# Patient Record
Sex: FEMALE | Race: WHITE | NOT HISPANIC OR LATINO | Employment: OTHER | URBAN - METROPOLITAN AREA
[De-identification: names, ages, dates, MRNs, and addresses within clinical notes are randomized per-mention and may not be internally consistent; named-entity substitution may affect disease eponyms.]

---

## 2023-11-29 ASSESSMENT — ENCOUNTER SYMPTOMS
HEADACHES: 1
BLURRED VISION: 1
HYPERTENSION: 1
SHORTNESS OF BREATH: 1
NECK PAIN: 1

## 2023-12-01 ENCOUNTER — TELEMEDICINE (OUTPATIENT)
Dept: PRIMARY CARE | Facility: CLINIC | Age: 68
End: 2023-12-01
Payer: MEDICARE

## 2023-12-01 DIAGNOSIS — E66.01 OBESITY, MORBID (MULTI): ICD-10-CM

## 2023-12-01 DIAGNOSIS — M16.12 OSTEOARTHRITIS OF LEFT HIP, UNSPECIFIED OSTEOARTHRITIS TYPE: ICD-10-CM

## 2023-12-01 DIAGNOSIS — I10 HYPERTENSION, UNSPECIFIED TYPE: ICD-10-CM

## 2023-12-01 DIAGNOSIS — I10 BENIGN ESSENTIAL HYPERTENSION: Primary | ICD-10-CM

## 2023-12-01 DIAGNOSIS — J32.1 FRONTAL SINUSITIS, UNSPECIFIED CHRONICITY: ICD-10-CM

## 2023-12-01 DIAGNOSIS — K21.9 GASTROESOPHAGEAL REFLUX DISEASE WITHOUT ESOPHAGITIS: ICD-10-CM

## 2023-12-01 PROCEDURE — 99442 PR PHYS/QHP TELEPHONE EVALUATION 11-20 MIN: CPT | Performed by: NURSE PRACTITIONER

## 2023-12-01 RX ORDER — LOSARTAN POTASSIUM AND HYDROCHLOROTHIAZIDE 12.5; 5 MG/1; MG/1
1 TABLET ORAL DAILY
COMMUNITY

## 2023-12-01 RX ORDER — AZITHROMYCIN 250 MG/1
TABLET, FILM COATED ORAL
Qty: 6 TABLET | Refills: 0 | Status: SHIPPED | OUTPATIENT
Start: 2023-12-01 | End: 2023-12-05

## 2023-12-01 RX ORDER — LOSARTAN POTASSIUM AND HYDROCHLOROTHIAZIDE 12.5; 5 MG/1; MG/1
1 TABLET ORAL DAILY
Qty: 90 TABLET | Refills: 1 | Status: SHIPPED | OUTPATIENT
Start: 2023-12-01

## 2023-12-01 ASSESSMENT — ENCOUNTER SYMPTOMS
HYPERTENSION: 1
BLURRED VISION: 1

## 2023-12-01 NOTE — PROGRESS NOTES
Subjective   Patient ID: Jennifer Jones is a 68 y.o. female who presents on telephone visit for follow up.    HPI:  Had COVID about 2 months ago, has not been able to get rid of sinus pressure and drainage.  Having back pain from arthritis.  Takes an aleve in the morning and tylenol at night before bed.  BP readings at home 140's/80's.    Under a lot of stress lately, found her 22 year old grandson and her sister both  in the past few weeks.  Denies chest pain, SOB, palpitations, dizziness,  or GI issues.  Taking medications as prescribed.           Hypertension  This is a chronic problem. The current episode started more than 1 year ago. The problem has been waxing and waning since onset. The problem is controlled. Associated symptoms include anxiety, blurred vision and malaise/fatigue. Agents associated with hypertension include decongestants and NSAIDs. Risk factors for coronary artery disease include obesity, post-menopausal state, sedentary lifestyle and stress. There are no compliance problems.         Review of Systems   Constitutional:  Positive for malaise/fatigue.   Eyes:  Positive for blurred vision.   All other systems reviewed and are negative.      Objective   There were no vitals taken for this visit.        Assessment/Plan   Problem List Items Addressed This Visit             ICD-10-CM    Benign essential hypertension - Primary I10     Continue losartan-hydrochlorothiazide as prescribed  Continue to monitor and record BP at home.         Esophageal reflux K21.9    Osteoarthritis of left hip M16.12    Obesity, morbid (CMS/[facility]) E66.01     Aware of need for lifestyle changes         Frontal sinusitis J32.1     Start azithromycin as directed for sinusitis.         Relevant Medications    azithromycin (Zithromax) 250 mg tablet     Other Visit Diagnoses         Codes    Hypertension, unspecified type     I10    Relevant Medications    losartan-hydrochlorothiazide (Hyzaar) 50-12.5 mg tablet         Would like to defer blood work until follow up visit due to all she has going on with family currently.  Follow up in 6 months or sooner if needed

## 2024-09-01 DIAGNOSIS — I10 HYPERTENSION, UNSPECIFIED TYPE: ICD-10-CM

## 2024-09-04 RX ORDER — LOSARTAN POTASSIUM AND HYDROCHLOROTHIAZIDE 12.5; 5 MG/1; MG/1
1 TABLET ORAL DAILY
Qty: 30 TABLET | Refills: 0 | Status: SHIPPED | OUTPATIENT
Start: 2024-09-04

## 2024-09-30 DIAGNOSIS — I10 HYPERTENSION, UNSPECIFIED TYPE: ICD-10-CM

## 2024-10-02 RX ORDER — LOSARTAN POTASSIUM AND HYDROCHLOROTHIAZIDE 12.5; 5 MG/1; MG/1
1 TABLET ORAL DAILY
Qty: 90 TABLET | Refills: 0 | Status: SHIPPED | OUTPATIENT
Start: 2024-10-02

## 2024-10-09 ENCOUNTER — LAB (OUTPATIENT)
Dept: LAB | Facility: LAB | Age: 69
End: 2024-10-09
Payer: MEDICARE

## 2024-10-09 ENCOUNTER — APPOINTMENT (OUTPATIENT)
Dept: PRIMARY CARE | Facility: CLINIC | Age: 69
End: 2024-10-09
Payer: MEDICARE

## 2024-10-09 VITALS
SYSTOLIC BLOOD PRESSURE: 136 MMHG | HEIGHT: 66 IN | DIASTOLIC BLOOD PRESSURE: 69 MMHG | HEART RATE: 107 BPM | WEIGHT: 243 LBS | BODY MASS INDEX: 39.05 KG/M2 | OXYGEN SATURATION: 96 %

## 2024-10-09 DIAGNOSIS — I10 BENIGN ESSENTIAL HYPERTENSION: ICD-10-CM

## 2024-10-09 DIAGNOSIS — Z12.11 ENCOUNTER FOR COLORECTAL CANCER SCREENING: ICD-10-CM

## 2024-10-09 DIAGNOSIS — H69.93 DYSFUNCTION OF BOTH EUSTACHIAN TUBES: ICD-10-CM

## 2024-10-09 DIAGNOSIS — Z00.00 ROUTINE GENERAL MEDICAL EXAMINATION AT HEALTH CARE FACILITY: Primary | ICD-10-CM

## 2024-10-09 DIAGNOSIS — R73.01 IFG (IMPAIRED FASTING GLUCOSE): ICD-10-CM

## 2024-10-09 DIAGNOSIS — I10 HYPERTENSION, UNSPECIFIED TYPE: ICD-10-CM

## 2024-10-09 DIAGNOSIS — Z13.820 ENCOUNTER FOR SCREENING FOR OSTEOPOROSIS: ICD-10-CM

## 2024-10-09 DIAGNOSIS — Z12.12 ENCOUNTER FOR COLORECTAL CANCER SCREENING: ICD-10-CM

## 2024-10-09 DIAGNOSIS — Z12.31 SCREENING MAMMOGRAM FOR BREAST CANCER: ICD-10-CM

## 2024-10-09 DIAGNOSIS — E66.01 OBESITY, MORBID (MULTI): ICD-10-CM

## 2024-10-09 LAB
ALBUMIN SERPL BCP-MCNC: 4.5 G/DL (ref 3.4–5)
ALP SERPL-CCNC: 81 U/L (ref 33–136)
ALT SERPL W P-5'-P-CCNC: 48 U/L (ref 7–45)
ANION GAP SERPL CALC-SCNC: 15 MMOL/L (ref 10–20)
AST SERPL W P-5'-P-CCNC: 42 U/L (ref 9–39)
BILIRUB SERPL-MCNC: 0.6 MG/DL (ref 0–1.2)
BUN SERPL-MCNC: 15 MG/DL (ref 6–23)
CALCIUM SERPL-MCNC: 9.6 MG/DL (ref 8.6–10.3)
CHLORIDE SERPL-SCNC: 101 MMOL/L (ref 98–107)
CHOLEST SERPL-MCNC: 183 MG/DL (ref 0–199)
CHOLESTEROL/HDL RATIO: 3.3
CO2 SERPL-SCNC: 28 MMOL/L (ref 21–32)
CREAT SERPL-MCNC: 0.85 MG/DL (ref 0.5–1.05)
EGFRCR SERPLBLD CKD-EPI 2021: 73 ML/MIN/1.73M*2
ERYTHROCYTE [DISTWIDTH] IN BLOOD BY AUTOMATED COUNT: 13.3 % (ref 11.5–14.5)
GLUCOSE SERPL-MCNC: 112 MG/DL (ref 74–99)
HCT VFR BLD AUTO: 45.6 % (ref 36–46)
HDLC SERPL-MCNC: 55.4 MG/DL
HGB BLD-MCNC: 14.8 G/DL (ref 12–16)
LDLC SERPL CALC-MCNC: 104 MG/DL
MCH RBC QN AUTO: 28.3 PG (ref 26–34)
MCHC RBC AUTO-ENTMCNC: 32.5 G/DL (ref 32–36)
MCV RBC AUTO: 87 FL (ref 80–100)
NON HDL CHOLESTEROL: 128 MG/DL (ref 0–149)
NRBC BLD-RTO: 0 /100 WBCS (ref 0–0)
PLATELET # BLD AUTO: 300 X10*3/UL (ref 150–450)
POTASSIUM SERPL-SCNC: 4 MMOL/L (ref 3.5–5.3)
PROT SERPL-MCNC: 7.2 G/DL (ref 6.4–8.2)
RBC # BLD AUTO: 5.23 X10*6/UL (ref 4–5.2)
SODIUM SERPL-SCNC: 140 MMOL/L (ref 136–145)
TRIGL SERPL-MCNC: 119 MG/DL (ref 0–149)
VLDL: 24 MG/DL (ref 0–40)
WBC # BLD AUTO: 9.5 X10*3/UL (ref 4.4–11.3)

## 2024-10-09 PROCEDURE — 1159F MED LIST DOCD IN RCRD: CPT | Performed by: INTERNAL MEDICINE

## 2024-10-09 PROCEDURE — 3078F DIAST BP <80 MM HG: CPT | Performed by: INTERNAL MEDICINE

## 2024-10-09 PROCEDURE — 1170F FXNL STATUS ASSESSED: CPT | Performed by: INTERNAL MEDICINE

## 2024-10-09 PROCEDURE — 80061 LIPID PANEL: CPT

## 2024-10-09 PROCEDURE — 99214 OFFICE O/P EST MOD 30 MIN: CPT | Performed by: INTERNAL MEDICINE

## 2024-10-09 PROCEDURE — 80053 COMPREHEN METABOLIC PANEL: CPT

## 2024-10-09 PROCEDURE — 3075F SYST BP GE 130 - 139MM HG: CPT | Performed by: INTERNAL MEDICINE

## 2024-10-09 PROCEDURE — 1123F ACP DISCUSS/DSCN MKR DOCD: CPT | Performed by: INTERNAL MEDICINE

## 2024-10-09 PROCEDURE — 85027 COMPLETE CBC AUTOMATED: CPT

## 2024-10-09 PROCEDURE — 3008F BODY MASS INDEX DOCD: CPT | Performed by: INTERNAL MEDICINE

## 2024-10-09 PROCEDURE — 36415 COLL VENOUS BLD VENIPUNCTURE: CPT

## 2024-10-09 PROCEDURE — 83036 HEMOGLOBIN GLYCOSYLATED A1C: CPT

## 2024-10-09 PROCEDURE — 1158F ADVNC CARE PLAN TLK DOCD: CPT | Performed by: INTERNAL MEDICINE

## 2024-10-09 PROCEDURE — G0439 PPPS, SUBSEQ VISIT: HCPCS | Performed by: INTERNAL MEDICINE

## 2024-10-09 RX ORDER — LOSARTAN POTASSIUM AND HYDROCHLOROTHIAZIDE 12.5; 1 MG/1; MG/1
1 TABLET ORAL DAILY
Qty: 30 TABLET | Refills: 11 | Status: SHIPPED | OUTPATIENT
Start: 2024-10-09 | End: 2025-10-09

## 2024-10-09 RX ORDER — CETIRIZINE HYDROCHLORIDE 10 MG/1
10 TABLET ORAL NIGHTLY
Qty: 30 TABLET | Refills: 2 | Status: SHIPPED | OUTPATIENT
Start: 2024-10-09 | End: 2025-01-07

## 2024-10-09 ASSESSMENT — ACTIVITIES OF DAILY LIVING (ADL)
DOING_HOUSEWORK: INDEPENDENT
MANAGING_FINANCES: INDEPENDENT
DRESSING: INDEPENDENT
BATHING: INDEPENDENT
TAKING_MEDICATION: INDEPENDENT
GROCERY_SHOPPING: INDEPENDENT

## 2024-10-09 ASSESSMENT — PATIENT HEALTH QUESTIONNAIRE - PHQ9
2. FEELING DOWN, DEPRESSED OR HOPELESS: NOT AT ALL
SUM OF ALL RESPONSES TO PHQ9 QUESTIONS 1 AND 2: 0
1. LITTLE INTEREST OR PLEASURE IN DOING THINGS: NOT AT ALL

## 2024-10-09 NOTE — PATIENT INSTRUCTIONS
Use flonase every day and zyrtec at night.    Keep taking your current blood pressure medication until you get your new prescription. We plan to increase it to 100mg losartan / 12.5mg hydrochlorothiazide.     We recommend that you get the influenza vaccine.     We recommend a mammograph, bone scan (DEXA scan), and colonoscopy.   Call [phone] to schedule imaging exams.     For colonoscopy -   [facility]: [phone]  [facility]: [phone]    Please complete fasting blood work. Fast for 10 hours, black coffee and water the morning of labs are OK.   [facility]  [address]  [address] [place] [address]  Phone: [phone]  M-F  7:30-5  Sat  8-12    See you back in 6 months.

## 2024-10-09 NOTE — PROGRESS NOTES
Subjective   Patient ID: Jennifer Jones is a 69 y.o. y/o female who presents to with a chief complaint of Medicare Annual Wellness Visit Subsequent and Earache.     HPI  She says it sounds muffled. She said it has been going on since Feb 2024 when she tested positive for COVID at that time. She has some tenderness behind her left ear / jaw. She has used ear drops and sinus medications without relief. She has used some flonase here and there. Has recurrent ear popping.     She has bad arthritis on her spine. Also having knee aches and plantar fasciitis in her left foot.     Blood pressure is 140s-150s at home.     Had a very stressful past few months with lots of family members passing and she recently moved. She moved back to [place] and lives with her daughter.     Objective   Vitals:    10/09/24 1544   BP: 136/69   Pulse: 107   SpO2: 96%       Physical Exam  Vitals reviewed.   HENT:      Right Ear: Tympanic membrane and ear canal normal. There is no impacted cerumen.      Left Ear: Ear canal normal. There is no impacted cerumen.   Cardiovascular:      Rate and Rhythm: Normal rate and regular rhythm.      Heart sounds: Normal heart sounds. No murmur heard.     No gallop.   Pulmonary:      Breath sounds: Normal breath sounds.   Abdominal:      General: Abdomen is flat. There is no distension.      Palpations: Abdomen is soft.      Tenderness: There is no abdominal tenderness.   Musculoskeletal:      Right lower leg: No edema.      Left lower leg: No edema.   Skin:     General: Skin is warm and dry.   Neurological:      Mental Status: She is alert. Mental status is at baseline.   Psychiatric:         Mood and Affect: Mood normal.         Behavior: Behavior normal.         Assessment/Plan   Problem List Items Addressed This Visit    #Eustachian tube dysfunction  -cont Flonase daily   -Rx Zyrtec 10mg nightly     #HTN  -high at home.  -increase losartan-hydrochlorothiazide to 100-12.5mg daily, goal is <  130/80    #Anxiety  -cont hydroxyzine 25mg daily]    #GERD  -cont omeprazole OTC     Influenza - recommended  COVID x3  RSV - declines  Prevnar - declines  Shingles - declines  DEXA - never - ordered   Mammo - 2012 - ordered   Colorectal screening - never - ordered     RTC 6 months     Tyrone Luna DO  Internal Medicine, PGY-II    I saw and evaluated the patient. I personally obtained the key and critical portions of the history and physical exam or was physically present for key and critical portions performed by the resident/fellow. I reviewed the resident/fellow's documentation and discussed the patient with the resident/fellow. I agree with the resident/fellow's medical decision making as documented in the note.    Zhou Snider, DO

## 2024-10-12 DIAGNOSIS — R73.01 IFG (IMPAIRED FASTING GLUCOSE): Primary | ICD-10-CM

## 2024-10-13 LAB
EST. AVERAGE GLUCOSE BLD GHB EST-MCNC: 128 MG/DL
HBA1C MFR BLD: 6.1 %

## 2024-10-14 ENCOUNTER — HOSPITAL ENCOUNTER (OUTPATIENT)
Dept: RADIOLOGY | Facility: HOSPITAL | Age: 69
Discharge: HOME | End: 2024-10-14
Payer: MEDICARE

## 2024-10-14 DIAGNOSIS — Z13.820 ENCOUNTER FOR OSTEOPOROSIS SCREENING IN ASYMPTOMATIC POSTMENOPAUSAL PATIENT: Primary | ICD-10-CM

## 2024-10-14 DIAGNOSIS — Z13.820 ENCOUNTER FOR SCREENING FOR OSTEOPOROSIS: ICD-10-CM

## 2024-10-14 DIAGNOSIS — Z78.0 ENCOUNTER FOR OSTEOPOROSIS SCREENING IN ASYMPTOMATIC POSTMENOPAUSAL PATIENT: Primary | ICD-10-CM

## 2024-10-14 PROCEDURE — 77080 DXA BONE DENSITY AXIAL: CPT | Performed by: RADIOLOGY

## 2024-10-14 PROCEDURE — 77080 DXA BONE DENSITY AXIAL: CPT

## 2024-10-27 NOTE — PROGRESS NOTES
"Subjective   Reason for Visit: Jennifer Jones is an 69 y.o. female here for a Medicare Wellness visit.               HPI    Patient Care Team:  Zhou Snider DO as PCP - General (Internal Medicine)     Review of Systems    Objective   Vitals:  /69   Pulse 107   Ht 1.67 m (5' 5.75\")   Wt 110 kg (243 lb)   SpO2 96%   BMI 39.52 kg/m²       Physical Exam    Assessment & Plan  Benign essential hypertension    Orders:  •  CBC; Future  •  Comprehensive Metabolic Panel; Future  •  Lipid panel; Future    Screening mammogram for breast cancer    Orders:  •  BI mammo bilateral screening tomosynthesis; Future    Encounter for screening for osteoporosis    Orders:  •  XR DEXA bone density; Future    Encounter for colorectal cancer screening    Orders:  •  Colonoscopy Screening; Average Risk Patient; Future    Hypertension, unspecified type    Orders:  •  losartan-hydrochlorothiazide (Hyzaar) 100-12.5 mg tablet; Take 1 tablet by mouth once daily.    Dysfunction of both eustachian tubes    Orders:  •  cetirizine (ZyrTEC) 10 mg tablet; Take 1 tablet (10 mg) by mouth once daily at bedtime.    Routine general medical examination at health care facility    Orders:  •  1 Year Follow Up In Primary Care - Wellness Exam; Future         {AWV Counseling (Optional):45787}     "

## 2024-10-27 NOTE — ASSESSMENT & PLAN NOTE
I saw and evaluated the patient. I personally obtained the key and critical portions of the history and physical exam or was physically present for key and critical portions performed by the resident/fellow. I reviewed the resident/fellow's documentation and discussed the patient with the resident/fellow. I agree with the resident/fellow's medical decision making as documented in the note.    Zhou Snider, DO

## 2024-11-01 ENCOUNTER — HOSPITAL ENCOUNTER (OUTPATIENT)
Dept: RADIOLOGY | Facility: CLINIC | Age: 69
Discharge: HOME | End: 2024-11-01
Payer: MEDICARE

## 2024-11-01 VITALS — BODY MASS INDEX: 38.57 KG/M2 | HEIGHT: 66 IN | WEIGHT: 240 LBS

## 2024-11-01 DIAGNOSIS — Z12.31 SCREENING MAMMOGRAM FOR BREAST CANCER: ICD-10-CM

## 2024-11-01 PROCEDURE — 77067 SCR MAMMO BI INCL CAD: CPT
